# Patient Record
Sex: MALE | Race: BLACK OR AFRICAN AMERICAN | NOT HISPANIC OR LATINO | Employment: UNEMPLOYED | URBAN - METROPOLITAN AREA
[De-identification: names, ages, dates, MRNs, and addresses within clinical notes are randomized per-mention and may not be internally consistent; named-entity substitution may affect disease eponyms.]

---

## 2017-09-12 ENCOUNTER — OFFICE VISIT (OUTPATIENT)
Dept: OPHTHALMOLOGY | Facility: CLINIC | Age: 77
End: 2017-09-12
Payer: MEDICARE

## 2017-09-12 DIAGNOSIS — H25.12 NS (NUCLEAR SCLEROSIS), LEFT: ICD-10-CM

## 2017-09-12 DIAGNOSIS — H52.7 REFRACTION DISORDER: ICD-10-CM

## 2017-09-12 DIAGNOSIS — H25.11 NS (NUCLEAR SCLEROSIS), RIGHT: ICD-10-CM

## 2017-09-12 DIAGNOSIS — E11.3212 TYPE 2 DIABETES MELLITUS WITH LEFT EYE AFFECTED BY MILD NONPROLIFERATIVE RETINOPATHY AND MACULAR EDEMA, WITHOUT LONG-TERM CURRENT USE OF INSULIN: ICD-10-CM

## 2017-09-12 DIAGNOSIS — H40.003 GLAUCOMA SUSPECT, BILATERAL: Primary | ICD-10-CM

## 2017-09-12 PROBLEM — E11.3292 TYPE 2 DIABETES MELLITUS WITH LEFT EYE AFFECTED BY MILD NONPROLIFERATIVE RETINOPATHY WITHOUT MACULAR EDEMA, WITHOUT LONG-TERM CURRENT USE OF INSULIN: Status: ACTIVE | Noted: 2017-09-12

## 2017-09-12 PROCEDURE — 92133 CPTRZD OPH DX IMG PST SGM ON: CPT | Mod: PBBFAC,PO | Performed by: OPHTHALMOLOGY

## 2017-09-12 PROCEDURE — 99211 OFF/OP EST MAY X REQ PHY/QHP: CPT | Mod: PBBFAC,PO | Performed by: OPHTHALMOLOGY

## 2017-09-12 PROCEDURE — 99999 PR PBB SHADOW E&M-EST. PATIENT-LVL I: CPT | Mod: PBBFAC,,, | Performed by: OPHTHALMOLOGY

## 2017-09-12 PROCEDURE — 92004 COMPRE OPH EXAM NEW PT 1/>: CPT | Mod: S$PBB,,, | Performed by: OPHTHALMOLOGY

## 2017-09-12 PROCEDURE — 92134 CPTRZ OPH DX IMG PST SGM RTA: CPT | Mod: PBBFAC,PO | Performed by: OPHTHALMOLOGY

## 2017-09-12 PROCEDURE — 92015 DETERMINE REFRACTIVE STATE: CPT | Mod: ,,, | Performed by: OPHTHALMOLOGY

## 2017-09-12 PROCEDURE — 92083 EXTENDED VISUAL FIELD XM: CPT | Mod: PBBFAC,PO | Performed by: OPHTHALMOLOGY

## 2017-09-12 RX ORDER — FUROSEMIDE 20 MG/1
20 TABLET ORAL 2 TIMES DAILY
COMMUNITY

## 2017-09-12 NOTE — LETTER
[facility] - Ophthalmology  [address] [address]  Phone: [phone]  Fax: [phone]   September 12, 2017    Geena Fenton, OD  [address]  [place] [address]    Patient: Duncan Miguel   MR Number: [id number]   YOB: 1940   Date of Visit: 9/12/2017       Dear Dr. Fenton:    Thank you for referring Duncan Miguel to me for evaluation. Here is my assessment and plan of care:    Assessment:   /    Plan:       For exam results, see Encounter Report.      ICD-10-CM ICD-9-CM    1. Glaucoma suspect, bilateral H40.003 365.00 [place] Visual Field - OU - Extended - Both Eyes findings out of proportion to gOCT and optic nerve exam ( reduced reliability)      Posterior Segment OCT Optic Nerve- Both eyes - mild changes only and normal iop   2. Type 2 diabetes mellitus with left eye affected by mild nonproliferative retinopathy without macular edema, without long-term current use of insulin E11.3292 250.50 Posterior Segment OCT Retina-Both eyes - mild ME - follow     362.04    3. NS (nuclear sclerosis), right H25.11 366.16 follow   4. NS (nuclear sclerosis), left H25.12 366.16 follow   5.      Refractive error    Same glasses for now    Return to clinic 6 months with mOCT and dilation.                  Below you will find my full exam findings. If you have questions, please do not hesitate to call me. I look forward to following Mr. Duncan Miguel along with you.    Sincerely,        Jorge Pressley MD       CC  No Recipients             Base Eye Exam     Visual Acuity (Snellen - Linear)       Right Left    Dist cc 20/20 -2 20/60 +1    Correction:  Glasses          Tonometry (Applanation, 2:28 PM)       Right Left    Pressure 14 10          Pachymetry (9/12/2017)       Right Left    Thickness 537 525          Pupils       Pupils Dark Light Shape React APD    Right PERRL 3 2 Round 1 None    Left PERRL 3 2 Round 1 None          Visual Fields       Right Left      Full Full          Extraocular Movement       Right Left     Full Full          Neuro/Psych     Oriented x3:  Yes    Mood/Affect:  Normal          Dilation     Both eyes:  1% Mydriacyl, 2.5% Phenylephrine @ 2:29 PM            Slit Lamp and Fundus Exam     External Exam       Right Left    External Normal Normal          Slit Lamp Exam       Right Left    Lids/Lashes Normal Normal    Conjunctiva/Sclera White and quiet Increase lacrimal lake     Cornea dense arcus dense arcus    Anterior Chamber Deep and quiet Deep and quiet    Iris Round and reactive Round and reactive    Lens 1+ Nuclear sclerosis 1+ Nuclear sclerosis, 2+ Cortical cataract    Vitreous Normal Normal          Fundus Exam       Right Left    Disc Normal Normal    C/D Ratio 0.6 0.6    Macula Normal Microaneurysms    Vessels Normal Normal    Periphery Normal Nasal Chorioretinal scar            Refraction     Wearing Rx       Sphere Cylinder Axis Add    Right [place]ons +0.75 080 +2.50    Left -0.25 +1.50 095 +2.50    Type:  Bifocal          Manifest Refraction       Sphere Cylinder [place] Dist [place]    Right [place]ons +0.75 085 NI    Left +0.25 +1.50 070 20/50 -2

## 2017-09-12 NOTE — PROGRESS NOTES
HPI     Glaucoma    Additional comments: coag eloiseal per dr. jang. no drops. no previous   surgeries           Comments   Pt states he was not told if he has glaucoma  1. COAG SUSPECT  2. Dialysis patient  3. DM       Last edited by Jerrica Escoto MA on 9/12/2017  2:08 PM. (History)            Assessment /Plan     For exam results, see Encounter Report.      ICD-10-CM ICD-9-CM    1. Glaucoma suspect, bilateral H40.003 365.00 [place] Visual Field - OU - Extended - Both Eyes findings out of proportion to gOCT and optic nerve exam ( reduced reliability)      Posterior Segment OCT Optic Nerve- Both eyes - mild changes only and normal iop   2. Type 2 diabetes mellitus with left eye affected by mild nonproliferative retinopathy without macular edema, without long-term current use of insulin E11.3292 250.50 Posterior Segment OCT Retina-Both eyes - mild ME - follow     362.04    3. NS (nuclear sclerosis), right H25.11 366.16 follow   4. NS (nuclear sclerosis), left H25.12 366.16 follow   5.      Refractive error    Same glasses for now    Return to clinic 6 months with mOCT and dilation.